# Patient Record
Sex: MALE
[De-identification: names, ages, dates, MRNs, and addresses within clinical notes are randomized per-mention and may not be internally consistent; named-entity substitution may affect disease eponyms.]

---

## 2020-01-17 PROBLEM — Z00.00 ENCOUNTER FOR PREVENTIVE HEALTH EXAMINATION: Status: ACTIVE | Noted: 2020-01-17

## 2020-02-03 ENCOUNTER — APPOINTMENT (OUTPATIENT)
Dept: OTOLARYNGOLOGY | Facility: CLINIC | Age: 20
End: 2020-02-03
Payer: MEDICAID

## 2020-02-03 VITALS
WEIGHT: 254 LBS | HEART RATE: 101 BPM | DIASTOLIC BLOOD PRESSURE: 89 MMHG | BODY MASS INDEX: 39.4 KG/M2 | SYSTOLIC BLOOD PRESSURE: 129 MMHG | HEIGHT: 67.5 IN

## 2020-02-03 DIAGNOSIS — Z86.69 PERSONAL HISTORY OF OTHER DISEASES OF THE NERVOUS SYSTEM AND SENSE ORGANS: ICD-10-CM

## 2020-02-03 DIAGNOSIS — Z83.3 FAMILY HISTORY OF DIABETES MELLITUS: ICD-10-CM

## 2020-02-03 DIAGNOSIS — R09.81 NASAL CONGESTION: ICD-10-CM

## 2020-02-03 PROCEDURE — 99204 OFFICE O/P NEW MOD 45 MIN: CPT

## 2020-02-03 RX ORDER — OFLOXACIN OTIC 3 MG/ML
0.3 SOLUTION AURICULAR (OTIC) TWICE DAILY
Qty: 1 | Refills: 5 | Status: ACTIVE | COMMUNITY
Start: 2020-02-03 | End: 1900-01-01

## 2020-02-03 NOTE — PHYSICAL EXAM
[Midline] : trachea located in midline position [Normal] : no rashes [de-identified] : cerumen on the left -moist and infected [de-identified] : inflammation noted on the left side

## 2020-02-03 NOTE — HISTORY OF PRESENT ILLNESS
[Tinnitus] : tinnitus [Nasal Congestion] : nasal congestion [de-identified] : 19 year old male presents with parents \par c/o bilateral tinnitus and hyperacusis x 10 weeks. Father states that his son has been complaining the both ears are ringing, puts his fingers in his ear and starts mumbling which makes him feel better. States that loud sounds and background noise has been bothering him more than usual. States the ringing is throughout the day, not sure if anything makes it better, he has a speech delay, has low communication skills. Has autism. Father states that he is always talking loud so he is not sure if he has a hearing loss. Has a hx of chronic nasal congestion, does not use any nasal sprays. \par Pt has no ear pain, ear drainage, hearing loss, vertigo, nasal congestion, nasal discharge, epistaxis, sinus infections, facial pain, facial pressure, throat pain, dysphagia or fevers\par \par  [No] : patient does not have a  history of radiation therapy [Vertigo] : no vertigo [Dizziness] : no dizziness [Anxiety] : no anxiety [Hearing Loss] : no hearing loss [Headache] : no headache [Otitis Media with Effusion] : no otitis media with effusion [Recurrent Otitis Media] : no recurrent otitis media [Congenital Ear Malformation] : no congenital ear malformation [Presbycusis] : no presbycusis [Otosclerosis] : no otosclerosis [Meniere Disease] : no Meniere disease [Hypertension] : no hypertension [Perilymphatic Fistula] : no perilymphatic fistula [Smoking] : no smoking [Loud Noise Exposure] : no history of loud noise exposure [Early Onset Hearing Loss] : no early onset hearing loss [Stroke] : no stroke [Facial Pressure] : no facial pressure [Facial Pain] : no facial pain [Diplopia] : no diplopia [Ear Fullness] : no ear fullness [Allergic Rhinitis] : no allergic rhinitis [Septal Deviation] : no septal deviation [Maxillary Tooth Infection] : no maxillary tooth infection [Environmental Allergies] : no environmental allergies [Seasonal Allergies] : no seasonal allergies [Environmental Allergens] : no environmental allergens [Nasal FB Removal] : no nasal foreign body removal [Adenoidectomy] : no adenoidectomy [Allergies] : no allergies [Asthma] : no asthma [Neck Mass] : no neck mass [Chills] : no chills [Neck Pain] : no neck pain [Cough] : no cough [Cold Intolerance] : no cold intolerance [Heat Intolerance] : no heat intolerance [Fatigue] : no fatigue [Hyperthyroidism] : no hyperthyroidism [Sialadenitis] : no sialadenitis [Non-Hodgkin Lymphoma] : no non-hodgkin lymphoma [Hodgkin Disease] : no hodgkin disease [Tobacco Use] : no tobacco use [Alcohol Use] : no alcohol use [Graves Disease] : no graves disease [Thyroid Cancer] : no thyroid cancer

## 2020-02-03 NOTE — ASSESSMENT
[FreeTextEntry1] : Left Otitis externa\par Ear infection noted and eaa drops were prescribed and placed at the time of the visit.\par RX ofloxacin \par Instructions given \par h2o precautions\par \par f/u in 2-3 weeks or prn \par \par \par

## 2020-02-27 ENCOUNTER — APPOINTMENT (OUTPATIENT)
Dept: OTOLARYNGOLOGY | Facility: CLINIC | Age: 20
End: 2020-02-27
Payer: MEDICAID

## 2020-02-27 VITALS
DIASTOLIC BLOOD PRESSURE: 76 MMHG | HEART RATE: 110 BPM | BODY MASS INDEX: 39.4 KG/M2 | WEIGHT: 254 LBS | HEIGHT: 67.5 IN | SYSTOLIC BLOOD PRESSURE: 124 MMHG

## 2020-02-27 DIAGNOSIS — H61.23 IMPACTED CERUMEN, BILATERAL: ICD-10-CM

## 2020-02-27 DIAGNOSIS — H61.22 IMPACTED CERUMEN, LEFT EAR: ICD-10-CM

## 2020-02-27 DIAGNOSIS — H93.13 TINNITUS, BILATERAL: ICD-10-CM

## 2020-02-27 DIAGNOSIS — H60.312 DIFFUSE OTITIS EXTERNA, LEFT EAR: ICD-10-CM

## 2020-02-27 PROCEDURE — 99213 OFFICE O/P EST LOW 20 MIN: CPT | Mod: 25

## 2020-02-27 PROCEDURE — 69210 REMOVE IMPACTED EAR WAX UNI: CPT

## 2020-02-29 NOTE — ASSESSMENT
[FreeTextEntry1] : Patient presents s/p AOE;\par No signs of infection \par \par wax-\par After informed verbal consent is obtained, cerumen is removed from the right and left  ear canal with a curette and suction.\par Rx: \par Debrox was prescribed and  is to be placed in both ears on a routine basis to keep them free of wax.\par Routine debridement suggested to keep the ears free of wax.\par \par f/u 4 months or prn \par

## 2020-02-29 NOTE — HISTORY OF PRESENT ILLNESS
[No] : patient does not have a  history of radiation therapy [de-identified] : 18 yo male\par Patient presents for follow up s/p AOE. Still using Ofloxacin. Father states he still touching his ears.\par no other modifying factors\par no nasal or throat complaints\par \par   [Anxiety] : no anxiety [Dizziness] : no dizziness [Headache] : no headache [Hearing Loss] : no hearing loss [Otitis Media with Effusion] : no otitis media with effusion [Recurrent Otitis Media] : no recurrent otitis media [Congenital Ear Malformation] : no congenital ear malformation [Presbycusis] : no presbycusis [Meniere Disease] : no Meniere disease [Otosclerosis] : no otosclerosis [Hypertension] : no hypertension [Perilymphatic Fistula] : no perilymphatic fistula [Loud Noise Exposure] : no history of loud noise exposure [Smoking] : no smoking [Early Onset Hearing Loss] : no early onset hearing loss [Stroke] : no stroke [Facial Pain] : no facial pain [Facial Pressure] : no facial pressure [Nasal Congestion] : no nasal congestion [Diplopia] : no diplopia [Ear Fullness] : no ear fullness [Allergic Rhinitis] : no allergic rhinitis [Maxillary Tooth Infection] : no maxillary tooth infection [Septal Deviation] : no septal deviation [Environmental Allergies] : no environmental allergies [Seasonal Allergies] : no seasonal allergies [Environmental Allergens] : no environmental allergens [Adenoidectomy] : no adenoidectomy [Nasal FB Removal] : no nasal foreign body removal [Allergies] : no allergies [Asthma] : no asthma [Chills] : no chills [Neck Pain] : no neck pain [Neck Mass] : no neck mass [Cold Intolerance] : no cold intolerance [Cough] : no cough [Heat Intolerance] : no heat intolerance [Fatigue] : no fatigue [Sialadenitis] : no sialadenitis [Hyperthyroidism] : no hyperthyroidism [Hodgkin Disease] : no hodgkin disease [Non-Hodgkin Lymphoma] : no non-hodgkin lymphoma [Tobacco Use] : no tobacco use [Alcohol Use] : no alcohol use [Thyroid Cancer] : no thyroid cancer [Graves Disease] : no graves disease

## 2020-02-29 NOTE — PHYSICAL EXAM
[Midline] : trachea located in midline position [Normal] : no rashes [de-identified] : left ear cerumen otherwise wnl

## 2020-05-01 ENCOUNTER — APPOINTMENT (OUTPATIENT)
Dept: NEUROLOGY | Facility: CLINIC | Age: 20
End: 2020-05-01

## 2020-05-08 ENCOUNTER — APPOINTMENT (OUTPATIENT)
Dept: NEUROLOGY | Facility: CLINIC | Age: 20
End: 2020-05-08

## 2020-05-15 ENCOUNTER — APPOINTMENT (OUTPATIENT)
Dept: NEUROLOGY | Facility: CLINIC | Age: 20
End: 2020-05-15

## 2020-07-08 ENCOUNTER — APPOINTMENT (OUTPATIENT)
Dept: OTOLARYNGOLOGY | Facility: CLINIC | Age: 20
End: 2020-07-08

## 2020-12-23 PROBLEM — Z86.69 HISTORY OF ACUTE OTITIS MEDIA: Status: RESOLVED | Noted: 2020-02-03 | Resolved: 2020-12-23
